# Patient Record
Sex: MALE | Race: WHITE | Employment: STUDENT | URBAN - METROPOLITAN AREA
[De-identification: names, ages, dates, MRNs, and addresses within clinical notes are randomized per-mention and may not be internally consistent; named-entity substitution may affect disease eponyms.]

---
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
## 2024-01-03 DIAGNOSIS — F41.9 ANXIETY: ICD-10-CM

## 2024-01-03 DIAGNOSIS — F63.81 INTERMITTENT EXPLOSIVE DISORDER: ICD-10-CM

## 2024-01-03 RX ORDER — RISPERIDONE 1 MG/ML
2 SOLUTION ORAL 2 TIMES DAILY
COMMUNITY
Start: 2015-11-20 | End: 2024-01-03 | Stop reason: SDUPTHER

## 2024-01-03 RX ORDER — VALPROIC ACID 250 MG/5ML
125 SOLUTION ORAL 2 TIMES DAILY
Qty: 150 ML | Refills: 1 | Status: SHIPPED | OUTPATIENT
Start: 2024-01-03 | End: 2024-03-05

## 2024-01-03 RX ORDER — RISPERIDONE 1 MG/ML
2 SOLUTION ORAL 2 TIMES DAILY
Qty: 120 ML | Refills: 1 | Status: SHIPPED | OUTPATIENT
Start: 2024-01-03 | End: 2024-02-28 | Stop reason: SDUPTHER

## 2024-01-03 RX ORDER — VALPROIC ACID 250 MG/5ML
2.5 SOLUTION ORAL 2 TIMES DAILY
COMMUNITY
Start: 2023-09-29 | End: 2024-01-03 | Stop reason: SDUPTHER

## 2024-01-18 DIAGNOSIS — F41.9 ANXIETY DISORDER, UNSPECIFIED: ICD-10-CM

## 2024-01-18 DIAGNOSIS — F41.9 ANXIETY: ICD-10-CM

## 2024-01-18 RX ORDER — DIAZEPAM 5 MG/1
5 TABLET ORAL 3 TIMES DAILY
Qty: 90 TABLET | Refills: 0 | Status: SHIPPED | OUTPATIENT
Start: 2024-01-18 | End: 2024-03-05

## 2024-02-28 DIAGNOSIS — F41.9 ANXIETY: ICD-10-CM

## 2024-02-28 RX ORDER — RISPERIDONE 1 MG/ML
2 SOLUTION ORAL 2 TIMES DAILY
Qty: 120 ML | Refills: 3 | Status: SHIPPED | OUTPATIENT
Start: 2024-02-28

## 2024-02-28 RX ORDER — RISPERIDONE 1 MG/ML
SOLUTION ORAL
Qty: 120 ML | Refills: 1 | OUTPATIENT
Start: 2024-02-28

## 2024-03-15 RX ORDER — DIAZEPAM 5 MG/1
5 TABLET ORAL 3 TIMES DAILY
Qty: 90 TABLET | Refills: 3 | Status: SHIPPED | OUTPATIENT
Start: 2024-03-15

## 2024-04-04 DIAGNOSIS — F63.81 INTERMITTENT EXPLOSIVE DISORDER: ICD-10-CM

## 2024-04-04 DIAGNOSIS — F41.9 ANXIETY: ICD-10-CM

## 2024-04-04 RX ORDER — DIAZEPAM 5 MG/1
5 TABLET ORAL 3 TIMES DAILY
Qty: 90 TABLET | Refills: 0 | OUTPATIENT
Start: 2024-04-04

## 2024-04-04 RX ORDER — VALPROIC ACID 250 MG/5ML
SOLUTION ORAL
Qty: 150 ML | Refills: 0 | OUTPATIENT
Start: 2024-04-04

## 2024-04-04 RX ORDER — VALPROIC ACID 250 MG/5ML
SOLUTION ORAL
Qty: 150 ML | Refills: 0 | Status: SHIPPED | OUTPATIENT
Start: 2024-04-04 | End: 2024-05-16

## 2024-07-27 DIAGNOSIS — F41.9 ANXIETY: ICD-10-CM

## 2024-07-29 RX ORDER — RISPERIDONE 1 MG/ML
SOLUTION ORAL
Qty: 120 ML | Refills: 4 | Status: SHIPPED | OUTPATIENT
Start: 2024-07-29

## 2024-12-15 DIAGNOSIS — F41.9 ANXIETY: ICD-10-CM

## 2024-12-16 RX ORDER — RISPERIDONE 1 MG/ML
SOLUTION ORAL
Qty: 120 ML | Refills: 4 | Status: SHIPPED | OUTPATIENT
Start: 2024-12-16

## 2024-12-26 DIAGNOSIS — F63.81 INTERMITTENT EXPLOSIVE DISORDER: ICD-10-CM

## 2024-12-26 RX ORDER — VALPROIC ACID 250 MG/5ML
SOLUTION ORAL
Qty: 150 ML | Refills: 0 | Status: SHIPPED | OUTPATIENT
Start: 2024-12-26

## 2025-01-22 DIAGNOSIS — F63.81 INTERMITTENT EXPLOSIVE DISORDER: ICD-10-CM

## 2025-01-22 RX ORDER — VALPROIC ACID 250 MG/5ML
SOLUTION ORAL
Qty: 150 ML | Refills: 3 | Status: SHIPPED | OUTPATIENT
Start: 2025-01-22

## 2025-06-13 ENCOUNTER — APPOINTMENT (OUTPATIENT)
Dept: PEDIATRIC NEUROLOGY | Facility: CLINIC | Age: 25
End: 2025-06-13
Payer: MEDICAID

## 2025-06-13 VITALS — HEIGHT: 71 IN | BODY MASS INDEX: 19.41 KG/M2 | WEIGHT: 138.67 LBS

## 2025-06-13 DIAGNOSIS — R56.9 SEIZURE (MULTI): Primary | ICD-10-CM

## 2025-06-13 DIAGNOSIS — F39 MOOD DISORDER: ICD-10-CM

## 2025-06-13 DIAGNOSIS — R41.89 COGNITIVE IMPAIRMENT: ICD-10-CM

## 2025-06-13 DIAGNOSIS — R45.87 IMPULSIVENESS: ICD-10-CM

## 2025-06-13 DIAGNOSIS — F41.9 ANXIETY: ICD-10-CM

## 2025-06-13 PROCEDURE — 3008F BODY MASS INDEX DOCD: CPT | Performed by: NURSE PRACTITIONER

## 2025-06-13 PROCEDURE — 99214 OFFICE O/P EST MOD 30 MIN: CPT | Performed by: NURSE PRACTITIONER

## 2025-06-13 RX ORDER — RISPERIDONE 1 MG/ML
SOLUTION ORAL
Qty: 150 ML | Refills: 11 | Status: SHIPPED | OUTPATIENT
Start: 2025-06-13

## 2025-06-13 NOTE — PATIENT INSTRUCTIONS
Diogo continues to have issues with mood regulation. Sleep has been inconsistent He gets aggressive at times with little provocation. Anxiety and OCD are still evident. I have talked with mom about the followin. Continue with medications as prescribed by palliative care.   2. Continue with Depakote dose, refills will be provided.   3. Increase the Risperdal dose to 2 mg AM 1 mg afternoon and 2 mg PM. Note the effect on mood and behavioral rigidity. Other options include changing the Risperdal to Ability.   3. Continue with structure, routine and consistency.  4. Order to check labs provided.   5. Call with updates, my nurse is Mikaela Clemons at [phone].   6. Follow up can be yearly.

## 2025-06-13 NOTE — PROGRESS NOTES
Subjective   Diogo Baldwin is a 24 y.o.   male.  KELLI Lind is a 24 year old young man with cognitive impairment, anxiety and impulsive behavior. He was last seen September, 2022.     Since his last visit he has been having issues with frequent UTI's. He had implants placed. He has been having issues with refluxing to the kidneys. He has been getting gentamycin three times weekly.      He is still a home body but likes to go out when he is motivated to get something. He will still have meltdowns with over stimulation, anxiety and not getting his way. Mood changes rapidly. He will throw things and get in your face. Family is not comfortable taking him out in public.      He is on Risperdal 2 ml BID, and Depakene 2.5 ml BID. No medication side effects noted.    He also gets Hydroxyzine PRN, Morphine prn. Clonidine prn. He is alos on Gabapentin TID.      Sleep: he is on Trazodone for sleep but does not generally stay asleep.     He has frequent episodes of staring and rapid eye movement that last for 5 seconds. He resumes activity.     He will generally nap in the afternoon for 1-2 hours.     Appetite has been OK.     He perseverates on a change in his schedule.      Objective   Neurological Exam  Mental Status  Awake and alert. Patient is nonverbal.  Today's exam finds an active young man in no acute distress. He responds to verbal prompts. .    Cranial Nerves  CN V: Facial sensation is normal.  CN VII: Full and symmetric facial movement.  CN XI: Shoulder shrug strength is normal.    Motor  Normal muscle bulk throughout. Normal muscle tone. Strength is 5/5 throughout all four extremities.    Sensory  Light touch is normal in upper and lower extremities.     Reflexes                                            Right                      Left  Brachioradialis                    2+                         2+  Biceps                                 2+                         2+  Patellar                                 2+                         2+  Achilles                                2+                         2+    Gait  Casual gait is normal including stance, stride, and arm swing.    Physical Exam  Constitutional:       General: He is awake.   Neurological:      Mental Status: He is alert.      Motor: Motor strength is normal.     Deep Tendon Reflexes:      Reflex Scores:       Bicep reflexes are 2+ on the right side and 2+ on the left side.       Brachioradialis reflexes are 2+ on the right side and 2+ on the left side.       Patellar reflexes are 2+ on the right side and 2+ on the left side.       Achilles reflexes are 2+ on the right side and 2+ on the left side.          Assessment/Plan   Diogo continues to have issues with mood regulation. Sleep has been inconsistent He gets aggressive at times with little provocation. Anxiety and OCD are still evident. I have talked with mom about the followin. Continue with medications as prescribed by palliative care.   2. Continue with Depakote dose, refills will be provided.   3. Increase the Risperdal dose to 2 mg AM 1 mg afternoon and 2 mg PM. Note the effect on mood and behavioral rigidity. Other options include changing the Risperdal to Ability.   3. Continue with structure, routine and consistency.  4. Order to check labs provided.   5. Call with updates, my nurse is Mikaela Clemons at [phone].   6. Follow up can be yearly.

## 2025-06-13 NOTE — LETTER
June 13, 2025     Valorie Garcia DO  [address]  [facility] [address]    Patient: Diogo Baldwin   YOB: 2000   Date of Visit: 6/13/2025       Dear Dr. Valorie Garcia DO:    Thank you for referring Diogo Baldwin to me for evaluation. Below are my notes for this consultation.  If you have questions, please do not hesitate to call me. I look forward to following your patient along with you.       Sincerely,     Aisha Carrillo, APRN-CNP, APRN-CNS      CC: No Recipients  ______________________________________________________________________________________    Subjective  Diogo Baldwin is a 24 y.o.   male.  KELLI Lind is a 24 year old young man with cognitive impairment, anxiety and impulsive behavior. He was last seen September, 2022.     Since his last visit he has been having issues with frequent UTI's. He had implants placed. He has been having issues with refluxing to the kidneys. He has been getting gentamycin three times weekly.      He is still a home body but likes to go out when he is motivated to get something. He will still have meltdowns with over stimulation, anxiety and not getting his way. Mood changes rapidly. He will throw things and get in your face. Family is not comfortable taking him out in public.      He is on Risperdal 2 ml BID, and Depakene 2.5 ml BID. No medication side effects noted.    He also gets Hydroxyzine PRN, Morphine prn. Clonidine prn. He is alos on Gabapentin TID.      Sleep: he is on Trazodone for sleep but does not generally stay asleep.     He has frequent episodes of staring and rapid eye movement that last for 5 seconds. He resumes activity.     He will generally nap in the afternoon for 1-2 hours.     Appetite has been OK.     He perseverates on a change in his schedule.      Objective  Neurological Exam  Mental Status  Awake and alert. Patient is nonverbal.  Today's exam finds an active young man  in no acute distress. He responds to verbal prompts. .    Cranial Nerves  CN V: Facial sensation is normal.  CN VII: Full and symmetric facial movement.  CN XI: Shoulder shrug strength is normal.    Motor  Normal muscle bulk throughout. Normal muscle tone. Strength is 5/5 throughout all four extremities.    Sensory  Light touch is normal in upper and lower extremities.     Reflexes                                            Right                      Left  Brachioradialis                    2+                         2+  Biceps                                 2+                         2+  Patellar                                2+                         2+  Achilles                                2+                         2+    Gait  Casual gait is normal including stance, stride, and arm swing.    Physical Exam  Constitutional:       General: He is awake.   Neurological:      Mental Status: He is alert.      Motor: Motor strength is normal.     Deep Tendon Reflexes:      Reflex Scores:       Bicep reflexes are 2+ on the right side and 2+ on the left side.       Brachioradialis reflexes are 2+ on the right side and 2+ on the left side.       Patellar reflexes are 2+ on the right side and 2+ on the left side.       Achilles reflexes are 2+ on the right side and 2+ on the left side.          Assessment/Plan  Diogo continues to have issues with mood regulation. Sleep has been inconsistent He gets aggressive at times with little provocation. Anxiety and OCD are still evident. I have talked with mom about the followin. Continue with medications as prescribed by palliative care.   2. Continue with Depakote dose, refills will be provided.   3. Increase the Risperdal dose to 2 mg AM 1 mg afternoon and 2 mg PM. Note the effect on mood and behavioral rigidity. Other options include changing the Risperdal to Ability.   3. Continue with structure, routine and consistency.  4. Order to check labs provided.   5. Call  with updates, my nurse is Mikaela Clemons at [phone].   6. Follow up can be yearly.

## 2025-06-13 NOTE — LETTER
June 13, 2025     Valorie Garcia DO  [address]  [facility] [address]    Patient: Diogo Baldwin   YOB: 2000   Date of Visit: 6/13/2025       Dear Dr. Valorie Garcia DO:    Thank you for referring Diogo Baldwin to me for evaluation. Below are my notes for this consultation.  If you have questions, please do not hesitate to call me. I look forward to following your patient along with you.       Sincerely,     Aisha Carrillo, APRN-CNP, APRN-CNS      CC: No Recipients  ______________________________________________________________________________________    Subjective  Diogo Baldwin is a 24 y.o.   male.  KELLI Lind is a 24 year old young man with cognitive impairment, anxiety and impulsive behavior. He was last seen September, 2022.     Since his last visit he has been having issues with frequent UTI's. He had implants placed. He has been having issues with refluxing to the kidneys. He has been getting gentamycin three times weekly.      He is still a home body but likes to go out when he is motivated to get something. He will still have meltdowns with over stimulation, anxiety and not getting his way. Mood changes rapidly. He will throw things and get in your face. Family is not comfortable taking him out in public.      He is on Risperdal 2 ml BID, and Depakene 2.5 ml BID. No medication side effects noted.    He also gets Hydroxyzine PRN, Morphine prn. Clonidine prn. He is alos on Gabapentin TID.      Sleep: he is on Trazodone for sleep but does not generally stay asleep.     He has frequent episodes of staring and rapid eye movement that last for 5 seconds. He resumes activity.     He will generally nap in the afternoon for 1-2 hours.     Appetite has been OK.     He perseverates on a change in his schedule.      Objective  Neurological Exam  Mental Status  Awake and alert. Patient is nonverbal.  Today's exam finds an active young man  in no acute distress. He responds to verbal prompts. .    Cranial Nerves  CN V: Facial sensation is normal.  CN VII: Full and symmetric facial movement.  CN XI: Shoulder shrug strength is normal.    Motor  Normal muscle bulk throughout. Normal muscle tone. Strength is 5/5 throughout all four extremities.    Sensory  Light touch is normal in upper and lower extremities.     Reflexes                                            Right                      Left  Brachioradialis                    2+                         2+  Biceps                                 2+                         2+  Patellar                                2+                         2+  Achilles                                2+                         2+    Gait  Casual gait is normal including stance, stride, and arm swing.    Physical Exam  Constitutional:       General: He is awake.   Neurological:      Mental Status: He is alert.      Motor: Motor strength is normal.     Deep Tendon Reflexes:      Reflex Scores:       Bicep reflexes are 2+ on the right side and 2+ on the left side.       Brachioradialis reflexes are 2+ on the right side and 2+ on the left side.       Patellar reflexes are 2+ on the right side and 2+ on the left side.       Achilles reflexes are 2+ on the right side and 2+ on the left side.          Assessment/Plan  Diogo continues to have issues with mood regulation. Sleep has been inconsistent He gets aggressive at times with little provocation. Anxiety and OCD are still evident. I have talked with mom about the followin. Continue with medications as prescribed by palliative care.   2. Continue with Depakote dose, refills will be provided.   3. Increase the Risperdal dose to 2 mg AM 1 mg afternoon and 2 mg PM. Note the effect on mood and behavioral rigidity. Other options include changing the Risperdal to Ability.   3. Continue with structure, routine and consistency.  4. Order to check labs provided.   5. Call  with updates, my nurse is Mikaela Clemons at [phone].   6. Follow up can be yearly.

## 2025-06-19 DIAGNOSIS — F63.81 INTERMITTENT EXPLOSIVE DISORDER: ICD-10-CM

## 2025-06-19 RX ORDER — VALPROIC ACID 250 MG/5ML
SOLUTION ORAL
Qty: 150 ML | Refills: 11 | OUTPATIENT
Start: 2025-06-19

## 2026-06-12 ENCOUNTER — APPOINTMENT (OUTPATIENT)
Dept: PEDIATRIC NEUROLOGY | Facility: CLINIC | Age: 26
End: 2026-06-12
Payer: MEDICAID